# Patient Record
Sex: FEMALE | Race: BLACK OR AFRICAN AMERICAN | Employment: FULL TIME | URBAN - METROPOLITAN AREA
[De-identification: names, ages, dates, MRNs, and addresses within clinical notes are randomized per-mention and may not be internally consistent; named-entity substitution may affect disease eponyms.]

---

## 2019-06-28 ENCOUNTER — HOSPITAL ENCOUNTER (INPATIENT)
Age: 19
LOS: 3 days | Discharge: HOME | DRG: 755 | End: 2019-07-01
Attending: EMERGENCY MEDICINE | Admitting: PSYCHIATRY & NEUROLOGY
Payer: MEDICAID

## 2019-06-28 DIAGNOSIS — R45.851 SUICIDAL IDEATION: Primary | ICD-10-CM

## 2019-06-28 PROBLEM — F43.20 ADJUSTMENT DISORDER: Status: ACTIVE | Noted: 2019-06-28

## 2019-06-28 PROBLEM — Z63.9 RELATIONSHIP DYSFUNCTION: Status: ACTIVE | Noted: 2019-06-28

## 2019-06-28 PROBLEM — F32.A DEPRESSION: Status: ACTIVE | Noted: 2019-06-28

## 2019-06-28 LAB
ALBUMIN SERPL-MCNC: 3.7 G/DL (ref 3.4–5)
ALBUMIN/GLOB SERPL: 1 {RATIO} (ref 0.8–1.7)
ALP SERPL-CCNC: 63 U/L (ref 45–117)
ALT SERPL-CCNC: 24 U/L (ref 13–56)
AMPHET UR QL SCN: NEGATIVE
ANION GAP SERPL CALC-SCNC: 8 MMOL/L (ref 3–18)
APAP SERPL-MCNC: <2 UG/ML (ref 10–30)
APPEARANCE UR: ABNORMAL
AST SERPL-CCNC: 34 U/L (ref 15–37)
ATRIAL RATE: 102 BPM
BACTERIA URNS QL MICRO: ABNORMAL /HPF
BARBITURATES UR QL SCN: NEGATIVE
BASOPHILS # BLD: 0 K/UL (ref 0–0.06)
BASOPHILS # BLD: 0 K/UL (ref 0–0.1)
BASOPHILS NFR BLD: 0 % (ref 0–2)
BASOPHILS NFR BLD: 0 % (ref 0–3)
BENZODIAZ UR QL: NEGATIVE
BILIRUB SERPL-MCNC: 0.3 MG/DL (ref 0.2–1)
BILIRUB UR QL: NEGATIVE
BUN SERPL-MCNC: 15 MG/DL (ref 7–18)
BUN/CREAT SERPL: 12 (ref 12–20)
CALCIUM SERPL-MCNC: 8.1 MG/DL (ref 8.5–10.1)
CALCULATED P AXIS, ECG09: 60 DEGREES
CALCULATED R AXIS, ECG10: 60 DEGREES
CALCULATED T AXIS, ECG11: 35 DEGREES
CANNABINOIDS UR QL SCN: POSITIVE
CHLORIDE SERPL-SCNC: 111 MMOL/L (ref 100–108)
CO2 SERPL-SCNC: 24 MMOL/L (ref 21–32)
COCAINE UR QL SCN: NEGATIVE
COLOR UR: YELLOW
CREAT SERPL-MCNC: 1.26 MG/DL (ref 0.6–1.3)
DIAGNOSIS, 93000: NORMAL
DIFFERENTIAL METHOD BLD: ABNORMAL
DIFFERENTIAL METHOD BLD: ABNORMAL
EOSINOPHIL # BLD: 0 K/UL (ref 0–0.4)
EOSINOPHIL # BLD: 0 K/UL (ref 0–0.4)
EOSINOPHIL NFR BLD: 0 % (ref 0–5)
EOSINOPHIL NFR BLD: 0 % (ref 0–5)
EPITH CASTS URNS QL MICRO: ABNORMAL /LPF (ref 0–5)
ERYTHROCYTE [DISTWIDTH] IN BLOOD BY AUTOMATED COUNT: 12.2 % (ref 11.6–14.5)
ERYTHROCYTE [DISTWIDTH] IN BLOOD BY AUTOMATED COUNT: 12.3 % (ref 11.6–14.5)
ETHANOL SERPL-MCNC: <3 MG/DL (ref 0–3)
GLOBULIN SER CALC-MCNC: 3.6 G/DL (ref 2–4)
GLUCOSE SERPL-MCNC: 131 MG/DL (ref 74–99)
GLUCOSE UR STRIP.AUTO-MCNC: 100 MG/DL
HCG UR QL: NEGATIVE
HCT VFR BLD AUTO: 34.7 % (ref 35–45)
HCT VFR BLD AUTO: 35.1 % (ref 35–45)
HDSCOM,HDSCOM: ABNORMAL
HGB BLD-MCNC: 11.8 G/DL (ref 12–16)
HGB BLD-MCNC: 12 G/DL (ref 12–16)
HGB UR QL STRIP: NEGATIVE
KETONES UR QL STRIP.AUTO: ABNORMAL MG/DL
LEUKOCYTE ESTERASE UR QL STRIP.AUTO: NEGATIVE
LYMPHOCYTES # BLD: 1.7 K/UL (ref 0.8–3.5)
LYMPHOCYTES # BLD: 3.4 K/UL (ref 0.9–3.6)
LYMPHOCYTES NFR BLD: 17 % (ref 21–52)
LYMPHOCYTES NFR BLD: 8 % (ref 20–51)
MCH RBC QN AUTO: 29.4 PG (ref 24–34)
MCH RBC QN AUTO: 29.4 PG (ref 24–34)
MCHC RBC AUTO-ENTMCNC: 34 G/DL (ref 31–37)
MCHC RBC AUTO-ENTMCNC: 34.2 G/DL (ref 31–37)
MCV RBC AUTO: 86 FL (ref 74–97)
MCV RBC AUTO: 86.3 FL (ref 74–97)
METHADONE UR QL: NEGATIVE
MONOCYTES # BLD: 0.5 K/UL (ref 0.05–1.2)
MONOCYTES # BLD: 1.7 K/UL (ref 0–1)
MONOCYTES NFR BLD: 3 % (ref 3–10)
MONOCYTES NFR BLD: 8 % (ref 2–9)
NEUTS BAND NFR BLD MANUAL: 2 % (ref 0–5)
NEUTS SEG # BLD: 16.1 K/UL (ref 1.8–8)
NEUTS SEG # BLD: 17.6 K/UL (ref 1.8–8)
NEUTS SEG NFR BLD: 80 % (ref 40–73)
NEUTS SEG NFR BLD: 82 % (ref 42–75)
NITRITE UR QL STRIP.AUTO: NEGATIVE
OPIATES UR QL: NEGATIVE
P-R INTERVAL, ECG05: 148 MS
PCP UR QL: NEGATIVE
PH UR STRIP: 5 [PH] (ref 5–8)
PLATELET # BLD AUTO: 296 K/UL (ref 135–420)
PLATELET # BLD AUTO: 328 K/UL (ref 135–420)
PLATELET COMMENTS,PCOM: ABNORMAL
PMV BLD AUTO: 8.9 FL (ref 9.2–11.8)
PMV BLD AUTO: 9.4 FL (ref 9.2–11.8)
POTASSIUM SERPL-SCNC: 3.6 MMOL/L (ref 3.5–5.5)
PROT SERPL-MCNC: 7.3 G/DL (ref 6.4–8.2)
PROT UR STRIP-MCNC: 30 MG/DL
Q-T INTERVAL, ECG07: 362 MS
QRS DURATION, ECG06: 72 MS
QTC CALCULATION (BEZET), ECG08: 471 MS
RBC # BLD AUTO: 4.02 M/UL (ref 4.2–5.3)
RBC # BLD AUTO: 4.08 M/UL (ref 4.2–5.3)
RBC #/AREA URNS HPF: ABNORMAL /HPF (ref 0–5)
RBC MORPH BLD: ABNORMAL
SALICYLATES SERPL-MCNC: <1.7 MG/DL (ref 2.8–20)
SODIUM SERPL-SCNC: 143 MMOL/L (ref 136–145)
SP GR UR REFRACTOMETRY: 1.02 (ref 1–1.03)
UROBILINOGEN UR QL STRIP.AUTO: 1 EU/DL (ref 0.2–1)
VENTRICULAR RATE, ECG03: 102 BPM
WBC # BLD AUTO: 20 K/UL (ref 4.6–13.2)
WBC # BLD AUTO: 21 K/UL (ref 4.6–13.2)
WBC URNS QL MICRO: ABNORMAL /HPF (ref 0–4)

## 2019-06-28 PROCEDURE — 81001 URINALYSIS AUTO W/SCOPE: CPT

## 2019-06-28 PROCEDURE — 74011250636 HC RX REV CODE- 250/636: Performed by: EMERGENCY MEDICINE

## 2019-06-28 PROCEDURE — 80053 COMPREHEN METABOLIC PANEL: CPT

## 2019-06-28 PROCEDURE — 80307 DRUG TEST PRSMV CHEM ANLYZR: CPT

## 2019-06-28 PROCEDURE — 96360 HYDRATION IV INFUSION INIT: CPT

## 2019-06-28 PROCEDURE — 81025 URINE PREGNANCY TEST: CPT

## 2019-06-28 PROCEDURE — 99285 EMERGENCY DEPT VISIT HI MDM: CPT

## 2019-06-28 PROCEDURE — 65220000003 HC RM SEMIPRIVATE PSYCH

## 2019-06-28 PROCEDURE — 85025 COMPLETE CBC W/AUTO DIFF WBC: CPT

## 2019-06-28 PROCEDURE — 93005 ELECTROCARDIOGRAM TRACING: CPT

## 2019-06-28 RX ORDER — LORAZEPAM 2 MG/ML
1-2 INJECTION INTRAMUSCULAR
Status: DISCONTINUED | OUTPATIENT
Start: 2019-06-28 | End: 2019-06-28

## 2019-06-28 RX ORDER — HYDROXYZINE 25 MG/1
25 TABLET, FILM COATED ORAL
Status: DISCONTINUED | OUTPATIENT
Start: 2019-06-28 | End: 2019-07-01 | Stop reason: HOSPADM

## 2019-06-28 RX ORDER — TRAZODONE HYDROCHLORIDE 50 MG/1
50 TABLET ORAL
Status: DISCONTINUED | OUTPATIENT
Start: 2019-06-28 | End: 2019-07-01 | Stop reason: HOSPADM

## 2019-06-28 RX ORDER — HALOPERIDOL 5 MG/1
5 TABLET ORAL
Status: DISCONTINUED | OUTPATIENT
Start: 2019-06-28 | End: 2019-06-28

## 2019-06-28 RX ORDER — HALOPERIDOL 5 MG/ML
5 INJECTION INTRAMUSCULAR
Status: DISCONTINUED | OUTPATIENT
Start: 2019-06-28 | End: 2019-06-28

## 2019-06-28 RX ORDER — LORAZEPAM 1 MG/1
1-2 TABLET ORAL
Status: DISCONTINUED | OUTPATIENT
Start: 2019-06-28 | End: 2019-06-28

## 2019-06-28 RX ORDER — IBUPROFEN 400 MG/1
400 TABLET ORAL
Status: DISCONTINUED | OUTPATIENT
Start: 2019-06-28 | End: 2019-07-01 | Stop reason: HOSPADM

## 2019-06-28 RX ADMIN — SODIUM CHLORIDE 1000 ML: 900 INJECTION, SOLUTION INTRAVENOUS at 05:38

## 2019-06-28 NOTE — ED NOTES
Bedside report handed off to [employer]. All essential information handed off. Pt stable at this time. Awaiting room assignment.

## 2019-06-28 NOTE — ED TRIAGE NOTES
Patient A/O x 3, brought into ED via [facility] for attempted suicide. As per [facility], patient was arguing with boyfriend and attempted SI by ingesting flagyl x 2, and by locking herself in bathroom and hanging herself\". Patient stated to police, she had string around her neck but immediately removed it. No marks noted to neck. Patient denies HI.

## 2019-06-28 NOTE — BSMART NOTE
ART THERAPY GROUP PROGRESS NOTE PATIENT SCHEDULED FOR GROUP AT: 13:00 
 
ATTENDANCE: Full PARTICIPATION LEVEL: Participates minimally in the art process ATTENTION LEVEL: Able to focus on task FOCUS: Anxiety reduction SYMBOLIC & THEMATIC CONTENT AS NOTED IN IMAGERY: She was apprehensive and guarded. She kept to herself and did not participate in group discussion. She utilized minimal effort on the task at hand.

## 2019-06-28 NOTE — ED PROVIDER NOTES
EMERGENCY DEPARTMENT HISTORY AND PHYSICAL EXAM    5:09 AM      Date: 6/28/2019  Patient Name: Daysi Luna    History of Presenting Illness     Chief Complaint   Patient presents with   [address] Problem         History Provided By: Patient    Additional History (Context): Daysi Luna is a 22 y.o. female with no significant past medical history who presents with complaint of suicidal ideation. She reportedly took 2 tablets of Flagyl, though an empty bottle was found at the hotel where she was. She states that she tried to hang herself and was found lying on the floor, but did not hung from any weight and did not complete the attempt. At this time she describes some discomfort in her left arm feels otherwise well. PCP: None        Past History     Past Medical History:  No past medical history on file. Past Surgical History:  No past surgical history on file. Family History:  No family history on file. Social History:  Social History     Tobacco Use    Smoking status: Not on file   Substance Use Topics    Alcohol use: Not on file    Drug use: Not on file       Allergies:  No Known Allergies      Review of Systems       Review of Systems   Constitutional: Negative for activity change and appetite change. HENT: Negative for congestion. Eyes: Negative for visual disturbance. Respiratory: Negative for cough and shortness of breath. Cardiovascular: Negative for chest pain. Gastrointestinal: Negative for abdominal pain, diarrhea, nausea and vomiting. Genitourinary: Negative for dysuria. Musculoskeletal: Negative for arthralgias and myalgias. Skin: Negative for rash. Neurological: Negative for weakness and numbness. Psychiatric/Behavioral: Positive for suicidal ideas. Physical Exam     Visit Vitals  /59   Pulse 99   Temp 97.9 °F (36.6 °C)   Resp 20   LMP 06/10/2019   SpO2 100%         Physical Exam   Constitutional: She is oriented to person, place, and time.  She appears well-developed and well-nourished. HENT:   Head: Normocephalic and atraumatic. Mouth/Throat: Oropharynx is clear and moist.   Eyes: Conjunctivae are normal.   Neck: Normal range of motion. Neck supple. No JVD present. No ligature marks, no tenderness of the neck. Cardiovascular: Regular rhythm, normal heart sounds and intact distal pulses. Tachycardia present. No murmur heard. Pulmonary/Chest: Effort normal and breath sounds normal.   Abdominal: Soft. Bowel sounds are normal. She exhibits no distension. There is no tenderness. Musculoskeletal: Normal range of motion. She exhibits no tenderness or deformity. Lymphadenopathy:     She has no cervical adenopathy. Neurological: She is alert and oriented to person, place, and time. Coordination normal.   Skin: Skin is warm and dry. No rash noted. Psychiatric: She has a normal mood and affect. Nursing note and vitals reviewed.         Diagnostic Study Results     Labs -  Recent Results (from the past 12 hour(s))   EKG, 12 LEAD, INITIAL    Collection Time: 06/28/19  3:34 AM   Result Value Ref Range    Ventricular Rate 102 BPM    Atrial Rate 102 BPM    P-R Interval 148 ms    QRS Duration 72 ms    Q-T Interval 362 ms    QTC Calculation (Bezet) 471 ms    Calculated P Axis 60 degrees    Calculated R Axis 60 degrees    Calculated T Axis 35 degrees    Diagnosis       Sinus tachycardia  Possible Left atrial enlargement  Borderline ECG  No previous ECGs available     CBC WITH AUTOMATED DIFF    Collection Time: 06/28/19  3:35 AM   Result Value Ref Range    WBC 21.0 (H) 4.6 - 13.2 K/uL    RBC 4.08 (L) 4.20 - 5.30 M/uL    HGB 12.0 12.0 - 16.0 g/dL    HCT 35.1 35.0 - 45.0 %    MCV 86.0 74.0 - 97.0 FL    MCH 29.4 24.0 - 34.0 PG    MCHC 34.2 31.0 - 37.0 g/dL    RDW 12.2 11.6 - 14.5 %    PLATELET 792 705 - 591 K/uL    MPV 9.4 9.2 - 11.8 FL    NEUTROPHILS 82 (H) 42 - 75 %    BAND NEUTROPHILS 2 0 - 5 %    LYMPHOCYTES 8 (L) 20 - 51 %    MONOCYTES 8 2 - 9 % EOSINOPHILS 0 0 - 5 %    BASOPHILS 0 0 - 3 %    ABS. NEUTROPHILS 17.6 (H) 1.8 - 8.0 K/UL    ABS. LYMPHOCYTES 1.7 0.8 - 3.5 K/UL    ABS. MONOCYTES 1.7 (H) 0 - 1.0 K/UL    ABS. EOSINOPHILS 0.0 0.0 - 0.4 K/UL    ABS. BASOPHILS 0.0 0.0 - 0.06 K/UL    DF MANUAL      PLATELET COMMENTS ADEQUATE PLATELETS      RBC COMMENTS NORMOCYTIC, NORMOCHROMIC     METABOLIC PANEL, COMPREHENSIVE    Collection Time: 06/28/19  3:35 AM   Result Value Ref Range    Sodium 143 136 - 145 mmol/L    Potassium 3.6 3.5 - 5.5 mmol/L    Chloride 111 (H) 100 - 108 mmol/L    CO2 24 21 - 32 mmol/L    Anion gap 8 3.0 - 18 mmol/L    Glucose 131 (H) 74 - 99 mg/dL    BUN 15 7.0 - 18 MG/DL    Creatinine 1.26 0.6 - 1.3 MG/DL    BUN/Creatinine ratio 12 12 - 20      GFR est AA >60 >60 ml/min/1.73m2    GFR est non-AA 54 (L) >60 ml/min/1.73m2    Calcium 8.1 (L) 8.5 - 10.1 MG/DL    Bilirubin, total 0.3 0.2 - 1.0 MG/DL    ALT (SGPT) 24 13 - 56 U/L    AST (SGOT) 34 15 - 37 U/L    Alk.  phosphatase 63 45 - 117 U/L    Protein, total 7.3 6.4 - 8.2 g/dL    Albumin 3.7 3.4 - 5.0 g/dL    Globulin 3.6 2.0 - 4.0 g/dL    A-G Ratio 1.0 0.8 - 1.7     SALICYLATE    Collection Time: 06/28/19  3:35 AM   Result Value Ref Range    Salicylate level <2.7 (L) 2.8 - 20.0 MG/DL   ACETAMINOPHEN    Collection Time: 06/28/19  3:35 AM   Result Value Ref Range    Acetaminophen level <2 (L) 10.0 - 30.0 ug/mL   ETHYL ALCOHOL    Collection Time: 06/28/19  3:35 AM   Result Value Ref Range    ALCOHOL(ETHYL),SERUM <3 0 - 3 MG/DL   URINALYSIS W/ RFLX MICROSCOPIC    Collection Time: 06/28/19  5:46 AM   Result Value Ref Range    Color YELLOW      Appearance CLOUDY      Specific gravity 1.023 1.005 - 1.030      pH (UA) 5.0 5.0 - 8.0      Protein 30 (A) NEG mg/dL    Glucose 100 (A) NEG mg/dL    Ketone TRACE (A) NEG mg/dL    Bilirubin NEGATIVE  NEG      Blood NEGATIVE  NEG      Urobilinogen 1.0 0.2 - 1.0 EU/dL    Nitrites NEGATIVE  NEG      Leukocyte Esterase NEGATIVE  NEG     HCG URINE, QL    Collection Time: 06/28/19  5:46 AM   Result Value Ref Range    HCG urine, QL NEGATIVE  NEG     DRUG SCREEN, URINE    Collection Time: 06/28/19  5:46 AM   Result Value Ref Range    BENZODIAZEPINES NEGATIVE  NEG      BARBITURATES NEGATIVE  NEG      THC (TH-CANNABINOL) POSITIVE (A) NEG      OPIATES NEGATIVE  NEG      PCP(PHENCYCLIDINE) NEGATIVE  NEG      COCAINE NEGATIVE  NEG      AMPHETAMINES NEGATIVE  NEG      METHADONE NEGATIVE  NEG      HDSCOM (NOTE)    URINE MICROSCOPIC ONLY    Collection Time: 06/28/19  5:46 AM   Result Value Ref Range    WBC 1 to 4 0 - 4 /hpf    RBC 0 to 1 0 - 5 /hpf    Epithelial cells 2+ 0 - 5 /lpf    Bacteria FEW (A) NEG /hpf       Radiologic Studies -   No orders to display         Medical Decision Making   I am the first provider for this patient. I reviewed the vital signs, available nursing notes, past medical history, past surgical history, family history and social history. Vital Signs-Reviewed the patient's vital signs. EKG:  Normal sinus rhythm, rate 102, , QTc 471. No acute ST or T wave changes, no STEMI. Records Reviewed: Nursing Notes (Time of Review: 5:09 AM)      Provider Notes (Medical Decision Making):   Latonia Townsend is a 22 y.o. female with no significant past medical history who presents with complaint of suicidal ideation. She reportedly took 2 tablets of Flagyl, though an empty bottle was found at the hotel where she was. She states that she tried to hang herself and was found lying on the floor, but did not hung from any weight and did not complete the attempt. At this time she describes some discomfort in her left arm feels otherwise well. Differential Diagnosis: Suicidal ideation with ingestion of reported 2 tablets of Flagyl, though it is unclear if this was an accurate number. Partial hanging attempt, though not from weight and acute injury is not suspected.     Testing: CBC, CMP, UDS, ETOH, Tylenol, salicylates, hCG  Treatments: IV fluids    Re-evaluations:  Patient with leukocytosis and to band cells, cause of which is unclear. She has no physical symptoms at this time. She did have positive for marijuana, so it may be related to this or possibly a stress response. She is now been hydrated, her heart rate has returned to normal.  Will repeat CBC to evaluate for improvement in these abnormalities, and if so we will plan to medically clear for CSB placement. Diagnosis     Clinical Impression:   1. Suicidal ideation        Disposition: 0700 AM : Pt care transferred to Dr. Salazar Staff  ,ED provider. History of patient complaint(s), available diagnostic reports and current treatment plan has been discussed thoroughly. Bedside rounding on patient occured : yes . Intended disposition of patient : TBD  Pending diagnostics reports and/or labs (please list): repeat cbc      Follow-up Information    None          Patient's Medications    No medications on file     _______________________________    Attestations:  Gerardo Laing MD acting as a scribe for and in the presence of Harry Cisneros MD      June 28, 2019 at [facility]       Provider Attestation:      I personally performed the services described in the documentation, reviewed the documentation, as recorded by the scribe in my presence, and it accurately and completely records my words and actions.  June 28, 2019 [facility] Harry Cisneros MD    _______________________________

## 2019-06-28 NOTE — ED NOTES
Bedside report received from [facility]. All essential information handed off. Pt stable at this time. Sitter at bedside. Awaiting eval from crisis.

## 2019-06-28 NOTE — ED NOTES
7:00 AM: Pt care assumed from Dr. Bhavana Reinoso, ED provider. Pt complaint(s), current treatment plan, progression and available diagnostic results have been discussed thoroughly. Rounding occurred: Yes  Intended Disposition: Pending  Pending diagnostic reports and/or labs (please list): Crisis eval and recommendations    11:00 AM: Patient admitted to [address] [place] [address] on a TDO.
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
## 2019-06-28 NOTE — BSMART NOTE
OCCUPATIONAL THERAPY PROGRESS NOTE Group Time:  2719 Attendance: The patient attended full group. Participation: The patient participated with minimal elaboration in the activity. Attention: The patient was able to focus on the activity. Interaction: The patient acknowledges others or responds to questions,  with no spontaneous interaction. Answered direct question in discussion on stress management.

## 2019-06-29 PROBLEM — F43.21 ADJUSTMENT DISORDER WITH DEPRESSED MOOD: Status: ACTIVE | Noted: 2019-06-28

## 2019-06-29 LAB
BASOPHILS # BLD: 0 K/UL (ref 0–0.1)
BASOPHILS NFR BLD: 0 % (ref 0–2)
DIFFERENTIAL METHOD BLD: ABNORMAL
EOSINOPHIL # BLD: 0.1 K/UL (ref 0–0.4)
EOSINOPHIL NFR BLD: 2 % (ref 0–5)
ERYTHROCYTE [DISTWIDTH] IN BLOOD BY AUTOMATED COUNT: 12.5 % (ref 11.6–14.5)
EST. AVERAGE GLUCOSE BLD GHB EST-MCNC: 103 MG/DL
HBA1C MFR BLD: 5.2 % (ref 4.2–5.6)
HCT VFR BLD AUTO: 35.3 % (ref 35–45)
HGB BLD-MCNC: 11.8 G/DL (ref 12–16)
LYMPHOCYTES # BLD: 3.4 K/UL (ref 0.9–3.6)
LYMPHOCYTES NFR BLD: 40 % (ref 21–52)
MCH RBC QN AUTO: 29.2 PG (ref 24–34)
MCHC RBC AUTO-ENTMCNC: 33.4 G/DL (ref 31–37)
MCV RBC AUTO: 87.4 FL (ref 74–97)
MONOCYTES # BLD: 0.9 K/UL (ref 0.05–1.2)
MONOCYTES NFR BLD: 10 % (ref 3–10)
NEUTS SEG # BLD: 4.1 K/UL (ref 1.8–8)
NEUTS SEG NFR BLD: 48 % (ref 40–73)
PLATELET # BLD AUTO: 309 K/UL (ref 135–420)
PMV BLD AUTO: 9.2 FL (ref 9.2–11.8)
RBC # BLD AUTO: 4.04 M/UL (ref 4.2–5.3)
WBC # BLD AUTO: 8.5 K/UL (ref 4.6–13.2)

## 2019-06-29 PROCEDURE — 83036 HEMOGLOBIN GLYCOSYLATED A1C: CPT

## 2019-06-29 PROCEDURE — 85025 COMPLETE CBC W/AUTO DIFF WBC: CPT

## 2019-06-29 PROCEDURE — 36415 COLL VENOUS BLD VENIPUNCTURE: CPT

## 2019-06-29 PROCEDURE — 65220000003 HC RM SEMIPRIVATE PSYCH

## 2019-06-29 NOTE — BH NOTES
Patient did not have any behavior changes or issues during this shift. She was observed socializing with her peers and also had a visitor during this shift. Patient was cooperative with unit rules, and staff directives. Patient expressed to staff she is not having any current thoughts of harming herself or others at this time. Staff will continue to monitor patient for safety.

## 2019-06-29 NOTE — BH NOTES
Teddy Dominguez is  participating in Treatment Goals and Concerns  Group    Group time: 2045    Patient Personal Daily Goals: Follow up  with patient  to discuss if morning goals  made in community goals group were met during the day. Goal orientation: Personal    Group therapy participation: fully participated    Therapeutic interventions reviewed and discussed: Staff discussed  Why  daily goals are made to improve inpatient treatment. Staff discussed the Mental Health programs offered. Unit schedule for groups,  Visiting hours, patient advocate name and phone number and where this information is posted on the unit, etc. Report any maintenance/housekeeping or treatment concerns to staff so it can be addressed by the Treatment Team.    Impression of participation:  Goals met:  Pt did not make any goals this morning  Pt.did not have any maintenance/housekeeping or treatment concerns to report to staff .

## 2019-06-29 NOTE — BH NOTES
GROUP THERAPY PROGRESS NOTE    Latonia Townsend is participating in Community   Group time: 45 minutes    Goal orientation: community    Group therapy participation: active    Therapeutic interventions reviewed and discussed: Daily Treatment Goal Sheet and discussion    Impression of participation: Patient actively participated in group
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
## 2019-06-29 NOTE — PROGRESS NOTES
Problem: Falls - Risk of  Goal: *Absence of Falls  Description  Document Kassi Nelson Fall Risk and appropriate interventions in the flowsheet. Pt will remain free of falls daily while hospitalized   Outcome: Progressing Towards Goal  Note:   Fall Risk Interventions:  Pt remains free of falls. Problem: Suicide/Homicide (Adult/Pediatric)  Goal: *STG: Remains safe in hospital  Description  Pt will not engage in any self injurious behaviors daily while hospitalized   Outcome: Progressing Towards Goal  Note:   Pt remains safe. Patient has been in the milieu for most of the shift keeping to herself. Patient is pleasant and cooperative and denies AVH. Patient contracts for safety and is compliant with medications. Goal: *STG/LTG: Complies with medication therapy  Description  Pt will comply with prescribed medications daily while hospitalized    Outcome: Progressing Towards Goal  Note:   Pt takes medications as ordered.

## 2019-06-30 PROCEDURE — 65220000003 HC RM SEMIPRIVATE PSYCH

## 2019-06-30 NOTE — BH NOTES
GROUP THERAPY PROGRESS NOTE    Jamie Tidwell was encouraged by staff but refused to participate in  Community.

## 2019-06-30 NOTE — BH NOTES
GROUP THERAPY PROGRESS NOTE    Yobani Baxter is participating in [place].      Group time: 45 minutes    Personal goal for participation: \"to become positive, open and engaging\"    Goal orientation: personal    Group therapy participation: active    Therapeutic interventions reviewed and discussed: discuss daily Tx goal(s); discuss guideline compliance, unit issues and community announcements

## 2019-06-30 NOTE — PROGRESS NOTES
Problem: Falls - Risk of  Goal: *Absence of Falls  Description  Document Margrett Bernheim Fall Risk and appropriate interventions in the flowsheet. Pt will remain free of falls daily while hospitalized   Outcome: Progressing Towards Goal     Problem: Suicide/Homicide (Adult/Pediatric)  Goal: *STG: Remains safe in hospital  Description  Pt will not engage in any self injurious behaviors daily while hospitalized   Outcome: Progressing Towards Goal     Problem: Suicide/Homicide (Adult/Pediatric)  Goal: *STG: Attends activities and groups  Description  Pt will attend and participate in 3 of 5 scheduled groups daily while hospitalized    Outcome: Progressing Towards Goal   Patient pleasant and cooperative. Smiles appropriately in conversation; eye contact good. Denies SI/HI/AH. She describes her mood as \"good\". Sociable and friendly with staff and peers. Attentive to ADLs. Appetite good. Attends group activities with good participation. Contracts for safety on unit.

## 2019-06-30 NOTE — BH NOTES
GROUP THERAPY PROGRESS NOTE    Maria Elena Morales is participating in [employer].      Group time: 30 minutes    Personal goal for participation: Setting daily goals    Goal orientation: community    Group therapy participation: active    Therapeutic interventions reviewed and discussed:  Discussing daily goals, the importance of setting them and staying motivated    Impression of participation: calm

## 2019-06-30 NOTE — BH NOTES
Pt in day area responds to direct questions asked of her. She only states she is doing better and may have visitors this afternoon. Has spent time on the telephone and with little interaction with others. When in day area she tends to sit byslelfstating doesn't like to be around a lot of people. Cont to monitor behavior and non skid socks to prevent slips.

## 2019-06-30 NOTE — BH NOTES
Patient has sitting in dayroom watching tv and socializing with peers. Denies SI/HI/ AH. When assessed regarding depression, patient states \"I'm ok. \" Contracts for safety on unit. Attend group activities.

## 2019-06-30 NOTE — PROGRESS NOTES
Behavioral Health Progress Note    Admit Date: 6/28/2019  Hospital day 2    Vitals :   Patient Vitals for the past 8 hrs:   BP Pulse Resp   06/30/19 0919 106/69 70 18     Labs:  No results found for this or any previous visit (from the past 24 hour(s)). Meds:   Current Facility-Administered Medications   Medication Dose Route Frequency    traZODone (DESYREL) tablet 50 mg  50 mg Oral QHS PRN    ibuprofen (MOTRIN) tablet 400 mg  400 mg Oral Q4H PRN    hydrOXYzine HCl (ATARAX) tablet 25 mg  25 mg Oral Q4H PRN      Hospital Problems: Principal Problem:    Adjustment disorder with depressed mood (6/28/2019)    Active Problems:    Depression (6/28/2019)      Relationship dysfunction (6/28/2019)        Subjective:   Medication side effects: none  none    Mental Status Exam  Sensorium: alert  Orientation: only aware of  time, place and person  Relations: cooperative  Eye Contact: appropriate  Appearance: shows no evidence of impairment  Thought Process: normal rate of thoughts and fair abstract reasoning/computation   Thought Content: no evidence of impairment   Suicidal: denies   Homicidal: none   Mood: is euthymic   Affect: stable  Memory: shows no evidence of impairment     Concentration: good  Abstraction: abstract  Insight: The patient's insight shows no evidence of impairment    OR Fair  Judgement: shows no evidence of impairment OR  Fair    Assessment/Plan:   improved  Nurses report no problems. She did talk w/ the boyfriend and they decided to back apart from each other for a time. She spoke to father who will come visit and come to hearing in AM. Does not feel need to stay in hospital.  Continue close observation, meds as is supportive care.

## 2019-07-01 VITALS
OXYGEN SATURATION: 100 % | RESPIRATION RATE: 16 BRPM | TEMPERATURE: 98.3 F | DIASTOLIC BLOOD PRESSURE: 73 MMHG | SYSTOLIC BLOOD PRESSURE: 117 MMHG | HEART RATE: 83 BPM

## 2019-07-01 NOTE — PROGRESS NOTES
Staffing:    Remains free of self harm thoughts and mood is stable   No. Depressive symptoms. did well even in interactions with her family. Case dismissed at court today. Medical:    VSS:fp=061/73,p=83. repeat cbc wnl  And wbc normalized at 8.5. Fibf0l=0.2      MSE:calm,smiling,\"I feel much better\". She feels supported by her father  And visits have gone well. She has decided to cool off the relationship with her boyfriend. she watns to focus on getting back to work and improving her relationship with her family. No self harm thoughts. Does agree to outpt therapy. A:doing well  Crisis has passed    P:discharge today to home. REcommend outpt therapy

## 2019-07-01 NOTE — DISCHARGE INSTRUCTIONS
Patient discharged from court. ***IMPORTANT NUMBERS***        [address] [facility]        [phone] [address] [facility]       [phone]    Suicide Prevention     [phone]          Patient is alert x3 and ambulatory. Patient has copy of discharge papers. Patient has no new prescriptions to be filled at this time. Patient has all personal belongings and has signed form. Patient denies thoughts of self harm or harm to others at this time. Patient armband taken and shredded. Patient discharged from court to home address.

## 2019-07-01 NOTE — BH NOTES
GROUP THERAPY PROGRESS NOTE Guerline Cortez is participating in [employer]. Group time: 15 minutes Personal goal for participation: To do everything I'm supposed to,stay posituive Goal orientation: {GROUP ORIENTATION:67621775} Group therapy participation: {GROUP THERAPY PARTICIPATION:22130869} Therapeutic interventions reviewed and discussed:  
 
Impression of participation: ***

## 2019-07-01 NOTE — BH NOTES
Patient voiced understanding of Dr Fred Aguiar recommendation of continuation of therapy upon discharge. Patient has all personal belongings to include new cell phone held in patient's locker. Patient signed for belongings, and signed discharge paperwork. Patient alert x3 and ambulatory. Patient denies thoughts of self harm or harm to others at this time. Patient's father provided transportation to home address. Patient escorted to reception by [facility] at time of discharge.

## 2019-07-02 NOTE — DISCHARGE SUMMARY
[facility]    Name:  Miriam Jamison  MR#:   [id number]  :  2000  ACCOUNT #:  [de-identified]  ADMIT DATE:  2019  DISCHARGE DATE:  2019    BASIS FOR ADMISSION:  Suicidal ideation and she took a mini overdose of medications. Please see my admission note for further history. HOSPITAL COURSE:  She was admitted to the hospital and participated in groups and milieu therapy. She was not started on any medication. She was able to process, but it happened with recent stressors. She had contact with family members and worked out a plan so that she would return to live with her father. She describes him as very supportive. Meanwhile, she has decided to call off the relationship with her boyfriend, not have contact right now and said she wants to focus on going back to work and improving her relationship with her family. She is admitted on a temporary penitentiary order and went to court today. The court then discharged her to home. She is pleased by this. MEDICAL:  No acute medical problems were identified during this hospitalization. On admission, her white blood count was elevated at 20,000, which was thought to be from stress. A repeat white blood count was 8.5 and CBC otherwise was within normal limits. CMP was within normal limits. Pregnancy test was negative. Urine drug screen was positive for THC. Hemoglobin A1c 5.2. CONDITION ON DISCHARGE:  Affect is full. She regrets how she handled the situation with her boyfriend and now wants to focus on getting her life back in order. She is looking forward to going back to work and is looking forward to improving her relationship with her family. She has no self-harm thoughts and no homicidal ideation. DIAGNOSES:  AXIS I:  Adjustment disorder with disturbance of mood and conduct, resolved. AXIS II:  None. AXIS III:  Status post overdose. PLAN:  She is discharged to home.   It is recommended that she have followup for therapy and the  is arranging that. MEDICATIONS:  None. PROGNOSIS:  Fair.         Kylah Angeles MD      [id number]  D:  07/01/2019 13:52  T:  07/02/2019 1:23  JOB #:  [id number]